# Patient Record
Sex: FEMALE | Race: OTHER | URBAN - METROPOLITAN AREA
[De-identification: names, ages, dates, MRNs, and addresses within clinical notes are randomized per-mention and may not be internally consistent; named-entity substitution may affect disease eponyms.]

---

## 2018-11-14 ENCOUNTER — APPOINTMENT (OUTPATIENT)
Dept: URBAN - METROPOLITAN AREA CLINIC 186 | Age: 58
Setting detail: DERMATOLOGY
End: 2018-11-14

## 2018-11-14 DIAGNOSIS — L20.84 INTRINSIC (ALLERGIC) ECZEMA: ICD-10-CM

## 2018-11-14 DIAGNOSIS — D22 MELANOCYTIC NEVI: ICD-10-CM

## 2018-11-14 PROBLEM — D22.21 MELANOCYTIC NEVI OF RIGHT EAR AND EXTERNAL AURICULAR CANAL: Status: ACTIVE | Noted: 2018-11-14

## 2018-11-14 PROBLEM — E03.9 HYPOTHYROIDISM, UNSPECIFIED: Status: ACTIVE | Noted: 2018-11-14

## 2018-11-14 PROBLEM — M12.9 ARTHROPATHY, UNSPECIFIED: Status: ACTIVE | Noted: 2018-11-14

## 2018-11-14 PROCEDURE — OTHER COUNSELING: OTHER

## 2018-11-14 PROCEDURE — OTHER REASSURANCE: OTHER

## 2018-11-14 PROCEDURE — OTHER TREATMENT REGIMEN: OTHER

## 2018-11-14 PROCEDURE — OTHER PRESCRIPTION: OTHER

## 2018-11-14 PROCEDURE — OTHER DIAGNOSIS COMMENT: OTHER

## 2018-11-14 PROCEDURE — 99213 OFFICE O/P EST LOW 20 MIN: CPT

## 2018-11-14 RX ORDER — DESOXIMETASONE 2.5 MG/ML
SPRAY TOPICAL
Qty: 1 | Refills: 2 | Status: ERX | COMMUNITY
Start: 2018-11-14

## 2018-11-14 ASSESSMENT — LOCATION DETAILED DESCRIPTION DERM
LOCATION DETAILED: LEFT ULNAR DORSAL HAND
LOCATION DETAILED: RIGHT ANTERIOR EARLOBE
LOCATION DETAILED: RIGHT ULNAR DORSAL HAND

## 2018-11-14 ASSESSMENT — LOCATION SIMPLE DESCRIPTION DERM
LOCATION SIMPLE: RIGHT HAND
LOCATION SIMPLE: RIGHT EAR
LOCATION SIMPLE: LEFT HAND

## 2018-11-14 ASSESSMENT — LOCATION ZONE DERM
LOCATION ZONE: HAND
LOCATION ZONE: EAR

## 2018-11-14 NOTE — PROCEDURE: TREATMENT REGIMEN
Hide Vanicream Products: No
Action 2: Continue
Continue Regimen: Topicort, apply to affected areas twice daily x3 weeks. Take 1 week off, repeat as needed
Detail Level: Zone

## 2018-11-14 NOTE — PROCEDURE: REASSURANCE
Hide Include Location In Plan Question?: No
Include Location In Plan?: Yes
Additional Note: Discussed removal options, but expressed that it may be considered cosmetic and that the fee is PATOS. Green cosmetic pricing sheet given.
Detail Level: Simple

## 2018-11-14 NOTE — HPI: MEDICATION
What Is The Medication?: topicort spray
What Condition Does This Medication Treat?: eczema
When Did You Start The Medicine?: 2016

## 2018-11-14 NOTE — PROCEDURE: DIAGNOSIS COMMENT
Detail Level: Zone
Comment: Mild flare today. States Topicort helps has been flaring lately. Discussed skin care at length. Restart Topicort spray